# Patient Record
Sex: FEMALE | Race: BLACK OR AFRICAN AMERICAN | URBAN - METROPOLITAN AREA
[De-identification: names, ages, dates, MRNs, and addresses within clinical notes are randomized per-mention and may not be internally consistent; named-entity substitution may affect disease eponyms.]

---

## 2020-10-27 ENCOUNTER — OFFICE VISIT (OUTPATIENT)
Dept: URBAN - METROPOLITAN AREA CLINIC 118 | Facility: CLINIC | Age: 20
End: 2020-10-27

## 2021-01-27 ENCOUNTER — WEB ENCOUNTER (OUTPATIENT)
Dept: URBAN - METROPOLITAN AREA CLINIC 118 | Facility: CLINIC | Age: 21
End: 2021-01-27

## 2021-01-27 ENCOUNTER — OFFICE VISIT (OUTPATIENT)
Dept: URBAN - METROPOLITAN AREA CLINIC 118 | Facility: CLINIC | Age: 21
End: 2021-01-27
Payer: COMMERCIAL

## 2021-01-27 ENCOUNTER — LAB OUTSIDE AN ENCOUNTER (OUTPATIENT)
Dept: URBAN - METROPOLITAN AREA CLINIC 118 | Facility: CLINIC | Age: 21
End: 2021-01-27

## 2021-01-27 DIAGNOSIS — R63.4 ABNORMAL LOSS OF WEIGHT: ICD-10-CM

## 2021-01-27 DIAGNOSIS — R10.11 RUQ ABDOMINAL PAIN: ICD-10-CM

## 2021-01-27 DIAGNOSIS — R11.2 INTRACTABLE VOMITING WITH NAUSEA, UNSPECIFIED VOMITING TYPE: ICD-10-CM

## 2021-01-27 PROCEDURE — G8427 DOCREV CUR MEDS BY ELIG CLIN: HCPCS | Performed by: INTERNAL MEDICINE

## 2021-01-27 PROCEDURE — 1036F TOBACCO NON-USER: CPT | Performed by: INTERNAL MEDICINE

## 2021-01-27 PROCEDURE — G8482 FLU IMMUNIZE ORDER/ADMIN: HCPCS | Performed by: INTERNAL MEDICINE

## 2021-01-27 PROCEDURE — G8417 CALC BMI ABV UP PARAM F/U: HCPCS | Performed by: INTERNAL MEDICINE

## 2021-01-27 PROCEDURE — 99204 OFFICE O/P NEW MOD 45 MIN: CPT | Performed by: INTERNAL MEDICINE

## 2021-01-27 RX ORDER — PANTOPRAZOLE SODIUM 40 MG/1
1 TABLET TABLET, DELAYED RELEASE ORAL ONCE A DAY
Qty: 30 TABLET | Refills: 2 | OUTPATIENT
Start: 2021-01-27

## 2021-01-27 NOTE — PHYSICAL EXAM GASTROINTESTINAL
Abdomen , soft, mild epigastric pain without guard, nondistended , no guarding or rigidity , no masses palpable , normal bowel sounds , Liver and Spleen , no hepatomegaly present , no hepatosplenomegaly , liver nontender , spleen not palpable

## 2021-01-27 NOTE — HPI-TODAY'S VISIT:
The patient is a 20-year-old female who self-referred for abdominal pain and vomiting.  This is been going on for the last 3 months, and has not been worked up as she has no primary care provider.  She states this started abruptly 3 months ago, and she has no history of nausea, vomiting, abdominal pain, or other GI complaints.  This usually happens 3 times a day after meals, and there is no blood in the bowel movements or the emesis.  Her bowel movements are regular without gross bleeding, and she has not started any new prescription or over-the-counter medications.  Her weight is up about 10 pounds in the last 6 months.  She denies chest pain, palpitations, or shortness of breath.  She has had no prior abdominal surgery, and has no history of food or seasonal allergies.  There is no family history of stomach cancer, gallbladder disease, or inflammatory bowel disease.  She has had no prior upper or lower endoscopy.  She denies significant acid reflux and she denies radiation of the discomfort in the epigastric region to the right upper quadrant.  Her primary complaint is the nausea and vomiting, and she says the epigastric discomfort is minimal.  There is no significant flaring of the symptoms with her cycles.

## 2021-01-28 LAB
ALBUMIN: 4.1
ALKALINE PHOSPHATASE: 107
ALT (SGPT): 11
AST (SGOT): 13
BASO (ABSOLUTE): 0
BASOS: 1
BILIRUBIN, DIRECT: 0.08
BILIRUBIN, TOTAL: 0.3
EOS (ABSOLUTE): 0.2
EOS: 4
HCG,BETA SUBUNIT,QUAL,SERUM: NEGATIVE
HEMATOCRIT: 39.7
HEMATOLOGY COMMENTS:: (no result)
HEMOGLOBIN: 12.6
IMMATURE CELLS: (no result)
IMMATURE GRANS (ABS): 0
IMMATURE GRANULOCYTES: 0
LIPASE: 16
LYMPHS (ABSOLUTE): 2.2
LYMPHS: 41
MCH: 26.9
MCHC: 31.7
MCV: 85
MONOCYTES(ABSOLUTE): 0.4
MONOCYTES: 7
NEUTROPHILS (ABSOLUTE): 2.5
NEUTROPHILS: 47
NRBC: (no result)
PLATELETS: 396
PROTEIN, TOTAL: 7.2
RBC: 4.69
RDW: 13.9
TSH: 1.14
WBC: 5.4

## 2021-01-29 ENCOUNTER — DASHBOARD ENCOUNTERS (OUTPATIENT)
Age: 21
End: 2021-01-29

## 2021-02-08 ENCOUNTER — OFFICE VISIT (OUTPATIENT)
Dept: URBAN - METROPOLITAN AREA CLINIC 117 | Facility: CLINIC | Age: 21
End: 2021-02-08